# Patient Record
Sex: FEMALE | URBAN - METROPOLITAN AREA
[De-identification: names, ages, dates, MRNs, and addresses within clinical notes are randomized per-mention and may not be internally consistent; named-entity substitution may affect disease eponyms.]

---

## 2024-03-15 ENCOUNTER — OV NP (OUTPATIENT)
Dept: URBAN - METROPOLITAN AREA CLINIC 27 | Facility: CLINIC | Age: 19
End: 2024-03-15

## 2024-03-15 VITALS
DIASTOLIC BLOOD PRESSURE: 75 MMHG | WEIGHT: 151 LBS | HEIGHT: 66 IN | BODY MASS INDEX: 24.27 KG/M2 | HEART RATE: 112 BPM | SYSTOLIC BLOOD PRESSURE: 123 MMHG

## 2024-03-15 NOTE — HPI-TODAY'S VISIT:
Ms. Pablo is a 18 year oldwoman who is here for abdominal pain.  She states that she has had the pain for 2 months,  She state she pain is in her LLQ pain.SHe states she has daily bowel movments with associated straining.  Her stools are viariable in consistency.  Varying from 1-7.  She still has the pain on occcasion.

## 2024-04-23 ENCOUNTER — TELEP (OUTPATIENT)
Dept: URBAN - METROPOLITAN AREA TELEHEALTH 2 | Facility: TELEHEALTH | Age: 19
End: 2024-04-23
Payer: COMMERCIAL

## 2024-04-23 DIAGNOSIS — K59.04 CHRONIC IDIOPATHIC CONSTIPATION: ICD-10-CM

## 2024-04-23 PROCEDURE — 99213 OFFICE O/P EST LOW 20 MIN: CPT | Performed by: INTERNAL MEDICINE

## 2024-04-23 NOTE — HPI-TODAY'S VISIT:
Ms. Pablo is a 18-year-old woman who is here for follow up of abdominal pain.  She has been taking probiotic and fiber gummy bears.  She states that her symptoms are better.  She has started introducing chicken/meat into her pescatarian diet.  She started with organic chicken breast.

## 2024-04-23 NOTE — PHYSICAL EXAM NECK/THYROID:
Called pt. Was able to assist with rescheduling appointment. Pt understands new date and time.    normal appearance , no deformities